# Patient Record
Sex: FEMALE | Race: WHITE | URBAN - METROPOLITAN AREA
[De-identification: names, ages, dates, MRNs, and addresses within clinical notes are randomized per-mention and may not be internally consistent; named-entity substitution may affect disease eponyms.]

---

## 2022-01-27 ENCOUNTER — OFFICE VISIT (OUTPATIENT)
Dept: URBAN - METROPOLITAN AREA CLINIC 82 | Facility: CLINIC | Age: 77
End: 2022-01-27
Payer: COMMERCIAL

## 2022-01-27 DIAGNOSIS — H52.4 PRESBYOPIA: ICD-10-CM

## 2022-01-27 DIAGNOSIS — H35.033 BILATERAL HYPERTENSIVE RETINOPATHY: ICD-10-CM

## 2022-01-27 DIAGNOSIS — H25.13 AGE-RELATED NUCLEAR CATARACT, BILATERAL: Primary | ICD-10-CM

## 2022-01-27 PROCEDURE — 92004 COMPRE OPH EXAM NEW PT 1/>: CPT | Performed by: OPTOMETRIST

## 2022-01-27 PROCEDURE — 92082 INTERMEDIATE VISUAL FIELD XM: CPT | Performed by: OPTOMETRIST

## 2022-01-27 ASSESSMENT — KERATOMETRY
OS: 44.88
OD: 44.88

## 2022-01-27 ASSESSMENT — INTRAOCULAR PRESSURE
OD: 15
OS: 13

## 2022-01-27 NOTE — IMPRESSION/PLAN
Impression: Presbyopia: H52.4. Plan: Discussed diagnosis and treatment options in detail with patient. Patient wants RX, and declines consult at this time. Will continue to observe. Discussed hypertensive retinopathy in detail with patient. Keep BP controlled. Follow up with PCP.

## 2024-02-09 ENCOUNTER — OFFICE VISIT (OUTPATIENT)
Dept: URBAN - METROPOLITAN AREA CLINIC 82 | Facility: CLINIC | Age: 79
End: 2024-02-09
Payer: MEDICARE

## 2024-02-09 DIAGNOSIS — H35.033 BILATERAL HYPERTENSIVE RETINOPATHY: Primary | ICD-10-CM

## 2024-02-09 DIAGNOSIS — H26.493 OTHER SECONDARY CATARACT, BILATERAL: ICD-10-CM

## 2024-02-09 DIAGNOSIS — H53.451 PERIPHERAL VISUAL FIELD DEFECT OF RIGHT EYE: ICD-10-CM

## 2024-02-09 PROCEDURE — 99214 OFFICE O/P EST MOD 30 MIN: CPT | Performed by: OPTOMETRIST

## 2024-02-09 PROCEDURE — 92250 FUNDUS PHOTOGRAPHY W/I&R: CPT | Performed by: OPTOMETRIST

## 2024-02-09 PROCEDURE — 92082 INTERMEDIATE VISUAL FIELD XM: CPT | Performed by: OPTOMETRIST

## 2024-02-09 ASSESSMENT — KERATOMETRY
OD: 44.75
OS: 44.75

## 2024-02-09 ASSESSMENT — INTRAOCULAR PRESSURE
OS: 17
OD: 18

## 2025-05-30 ENCOUNTER — OFFICE VISIT (OUTPATIENT)
Dept: URBAN - METROPOLITAN AREA CLINIC 82 | Facility: CLINIC | Age: 80
End: 2025-05-30
Payer: MEDICARE

## 2025-05-30 DIAGNOSIS — Z96.1 PRESENCE OF INTRAOCULAR LENS: ICD-10-CM

## 2025-05-30 DIAGNOSIS — H35.3132 BILATERAL NONEXUDATIVE AGE-RELATED MACULAR DEGENERATION, INTERMEDIATE DRY STAGE: Primary | ICD-10-CM

## 2025-05-30 PROCEDURE — 92250 FUNDUS PHOTOGRAPHY W/I&R: CPT | Performed by: OPTOMETRIST

## 2025-05-30 PROCEDURE — 99214 OFFICE O/P EST MOD 30 MIN: CPT | Performed by: OPTOMETRIST

## 2025-05-30 ASSESSMENT — INTRAOCULAR PRESSURE
OS: 14
OD: 13

## 2025-05-30 ASSESSMENT — KERATOMETRY
OD: 45.25
OS: 44.63